# Patient Record
Sex: FEMALE | Race: WHITE | NOT HISPANIC OR LATINO | Employment: OTHER | URBAN - NONMETROPOLITAN AREA
[De-identification: names, ages, dates, MRNs, and addresses within clinical notes are randomized per-mention and may not be internally consistent; named-entity substitution may affect disease eponyms.]

---

## 2023-11-03 ENCOUNTER — OFFICE VISIT (OUTPATIENT)
Dept: PULMONOLOGY | Facility: CLINIC | Age: 58
End: 2023-11-03
Payer: COMMERCIAL

## 2023-11-03 ENCOUNTER — LAB (OUTPATIENT)
Dept: LAB | Facility: HOSPITAL | Age: 58
End: 2023-11-03
Payer: COMMERCIAL

## 2023-11-03 VITALS
HEIGHT: 65 IN | SYSTOLIC BLOOD PRESSURE: 122 MMHG | HEART RATE: 78 BPM | BODY MASS INDEX: 16.33 KG/M2 | OXYGEN SATURATION: 100 % | WEIGHT: 98 LBS | RESPIRATION RATE: 18 BRPM | DIASTOLIC BLOOD PRESSURE: 86 MMHG

## 2023-11-03 DIAGNOSIS — R06.02 SHORTNESS OF BREATH: Primary | ICD-10-CM

## 2023-11-03 DIAGNOSIS — J30.89 NON-SEASONAL ALLERGIC RHINITIS DUE TO FUNGAL SPORES: ICD-10-CM

## 2023-11-03 DIAGNOSIS — R06.02 SHORTNESS OF BREATH: ICD-10-CM

## 2023-11-03 DIAGNOSIS — Z86.16 PERSONAL HISTORY OF COVID-19: ICD-10-CM

## 2023-11-03 DIAGNOSIS — Z77.120 MOLD EXPOSURE: ICD-10-CM

## 2023-11-03 PROCEDURE — 86003 ALLG SPEC IGE CRUDE XTRC EA: CPT

## 2023-11-03 PROCEDURE — 86606 ASPERGILLUS ANTIBODY: CPT

## 2023-11-03 PROCEDURE — 36415 COLL VENOUS BLD VENIPUNCTURE: CPT

## 2023-11-03 PROCEDURE — 82785 ASSAY OF IGE: CPT

## 2023-11-03 PROCEDURE — 86612 BLASTOMYCES ANTIBODY: CPT

## 2023-11-03 RX ORDER — CETIRIZINE HYDROCHLORIDE 10 MG/1
10 TABLET ORAL DAILY
Qty: 30 TABLET | Refills: 5 | Status: SHIPPED | OUTPATIENT
Start: 2023-11-03

## 2023-11-03 RX ORDER — LANSOPRAZOLE 30 MG/1
30 CAPSULE, DELAYED RELEASE ORAL DAILY
COMMUNITY
Start: 2000-01-01

## 2023-11-03 RX ORDER — ALBUTEROL SULFATE 90 UG/1
2 AEROSOL, METERED RESPIRATORY (INHALATION) EVERY 4 HOURS PRN
Qty: 17 G | Refills: 5 | Status: SHIPPED | OUTPATIENT
Start: 2023-11-03

## 2023-11-03 NOTE — PROGRESS NOTES
New Pulmonary Patient Office Visit      Patient Name: Simi Lenz    Referring Physician: Alba Crabtree*    Chief Complaint:    Chief Complaint   Patient presents with    Breathing Problem    Consult       History of Present Illness: Simi Lenz is a 58 y.o. female who is here today to establish care with Pulmonary for intermittent episodes of shortness of breath.    COVID in July 2022 and Jan 2023, but never required hospitalization. During the episodes she had increased gerd which caused significant cough.   She has had intermittent episodes of dry coughing over the years.     Not on any allergy meds, but on prevacid for gerd for 20+ yrs.    No coughing when first lying flat.     April 2023 started getting palpitations and was diagnosed with SVT. CXR showed hyperinflation and then got pfts that were normal.     Does have more difficulty with cold air exposure causing brief episodes of shortness of breath, but remains very active and hikes frequently.  Works from home.     Moved to [place] 15 yrs ago and has had a lot of allergy and sinus issues here.  + mold in her house  Has had mold remediation and in workup multiple molds found.       Subjective      Review of Systems:   Review of Systems   HENT:  Negative for postnasal drip.    Respiratory:  Positive for cough and shortness of breath.    Gastrointestinal:  Positive for GERD.   Allergic/Immunologic: Positive for environmental allergies.       Past Medical History:   Past Medical History:   Diagnosis Date    Melanoma        Past Surgical History:   Past Surgical History:   Procedure Laterality Date    KNEE SURGERY Left     THUMB AMPUTATION      melanoma       Family History:   Family History   Problem Relation Age of Onset    Hypertension Neg Hx        Social History:   Social History     Socioeconomic History    Marital status:    Tobacco Use    Smoking status: Never    Tobacco comments:     Around second hand smoke        Medications:  "    Current Outpatient Medications:     lansoprazole (PREVACID) 30 MG capsule, Take 1 capsule by mouth Daily., Disp: , Rfl:     albuterol sulfate  (90 Base) MCG/ACT inhaler, Inhale 2 puffs Every 4 (Four) Hours As Needed for Wheezing., Disp: 17 g, Rfl: 5    cetirizine (zyrTEC) 10 MG tablet, Take 1 tablet by mouth Daily., Disp: 30 tablet, Rfl: 5    Allergies:   Allergies   Allergen Reactions    Ciprofloxacin Nausea Only    Clindamycin Itching    Codeine Swelling    Penicillins Other (See Comments)     Hospitalized as a child    Sulfa Antibiotics Other (See Comments)    Sulfamethoxazole-Trimethoprim Swelling       Objective     Physical Exam:  Vital Signs:   Vitals:    11/03/23 1023   BP: 122/86   Pulse: 78   Resp: 18   SpO2: 100%   Weight: 44.5 kg (98 lb)   Height: 165.1 cm (65\")       Physical Exam  Vitals and nursing note reviewed.   Constitutional:       Appearance: She is well-developed.   HENT:      Head: Normocephalic and atraumatic.      Right Ear: Tympanic membrane and external ear normal.      Left Ear: Tympanic membrane and external ear normal.      Nose: Nose normal. No congestion or rhinorrhea.      Mouth/Throat:      Mouth: Mucous membranes are moist.      Pharynx: Oropharynx is clear. No oropharyngeal exudate or posterior oropharyngeal erythema.   Eyes:      General: No scleral icterus.        Right eye: No discharge.         Left eye: No discharge.      Extraocular Movements: Extraocular movements intact.      Conjunctiva/sclera: Conjunctivae normal.   Neck:      Trachea: No tracheal deviation.   Cardiovascular:      Rate and Rhythm: Normal rate and regular rhythm.      Heart sounds: No murmur heard.  Pulmonary:      Effort: No respiratory distress.      Breath sounds: No wheezing, rhonchi or rales.   Abdominal:      General: There is no distension.      Palpations: Abdomen is soft.   Musculoskeletal:         General: No tenderness. Normal range of motion.      Cervical back: Normal range of " "motion and neck supple.      Right lower leg: No edema.      Left lower leg: No edema.   Skin:     General: Skin is warm and dry.      Findings: No rash.   Neurological:      Mental Status: She is alert and oriented to person, place, and time.      Motor: No weakness.      Coordination: Coordination normal.   Psychiatric:         Mood and Affect: Mood normal.         Judgment: Judgment normal.         Results Review:   Labs: Reviewed.      No results found for: \"WBC\", \"HGB\", \"HCT\", \"MCV\", \"PLT\"    No results found for: \"CBCDIF\", \"CMP\"     Micro: As of November 3, 2023   No results found for: \"RESPCX\"  No results found for: \"BLOODCX\"  No results found for: \"URINECX\"  No results found for: \"MRSACX\"  No results found for: \"MRSAPCR\"  No results found for: \"URCX\"  No components found for: \"LOWRESPCF\"  No results found for: \"THROATCX\"  No results found for: \"CULTURES\"  No components found for: \"STREPBCX\"  No results found for: \"STREPPNEUAG\"  No results found for: \"LEGIONELLA\"  No results found for: \"MYCOPLASCX\"  No results found for: \"GCCX\"  No results found for: \"WOUNDCX\"  No results found for: \"BODYFLDCX\"    ABG: No results found for: \"PHART\", \"GID6OBE\", \"PO2ART\", \"HGBBG\", \"X5GHQIUE\", \"CFIO2\", \"FCOHB\", \"CARBOXYHGB\", \"FMETHB\"    Echo:     Radiology Scans:   Last imaging was reviewed in great detail with the patient.     X-ray chest PA and lateral  Order: [id number]  Impression    Hyperexpanded lungs with no acute cardiopulmonary process.        Images reviewed, interpreted, dictated and electronically signed by Royal Mckeon MD  Narrative    TWO-VIEW CHEST      HISTORY: Heart palpitations, shortness of breath for one week,  persistent cough, right-sided chest pain. Nonsmoker.    COMPARISON: 1/16/2023    FINDINGS: Two views of the chest were performed.  The heart is normal in  size. The mediastinal and hilar contours are unremarkable. The lungs are  well expanded. There is no edema or infiltrate. There are no " effusions.  There is no pneumothorax. There is increased gas in the splenic flexure.  The visualized bony thorax is intact.  Exam End: 03/16/23 16:15    Specimen Collected: 03/16/23 19:38 Last Resulted: 03/16/23 19:40   Received From: [email or web address]  Result Received: 11/03/23 10:18       PFT IMPRESSION:   November 2023 FEV1 61%, FEV1/FVC ratio 57.  No significant bronchodilator responsiveness.  However, poor patient effort  April 2023 PFT showed FEV1 134%, FEV1/FVC ratio 77.  DLCO 147%, DLCO/%.    Assessment / Plan      Assessment/Plan:    1. Shortness of breath  Unclear etiology given PFTs previously showed FEV1 greater than 100%, but today FEV1 was 61%.  Patient is confused as well as she feels like her symptoms were actually worse in April when the original PFTs were done.  Discussed results and she would like to try as needed albuterol only.  Discussed that if she is requiring it frequently at next visit we will need to put her on a maintenance medication.  She is agreeable to this plan.    - Spirometry - Pre & Post Bronchodilator; Future  - albuterol sulfate  (90 Base) MCG/ACT inhaler; Inhale 2 puffs Every 4 (Four) Hours As Needed for Wheezing.  Dispense: 17 g; Refill: 5    2. Mold exposure  Check lab work.  - Regional Allergen Zone 8 / With IgE; Future  - Fungal Antibodies, Quantitative Double Immunodiffusion; Future    3. Personal history of COVID-19  Many symptoms seem to have started after COVID.    4. Non-seasonal allergic rhinitis due to fungal spores  Check lab work and start daily antihistamines.  - Regional Allergen Zone 8 / With IgE; Future  - cetirizine (zyrTEC) 10 MG tablet; Take 1 tablet by mouth Daily.  Dispense: 30 tablet; Refill: 5       Follow Up:   Return in about 3 months (around 2/3/2024).    Monique Finn MD  Pulmonary/Critical Care Physician   [facility]      Please note that portions of this note may have been completed with a voice recognition program. Efforts  were made to edit the dictations, but occasionally words are mistranscribed.

## 2023-11-07 ENCOUNTER — PATIENT ROUNDING (BHMG ONLY) (OUTPATIENT)
Dept: PULMONOLOGY | Facility: CLINIC | Age: 58
End: 2023-11-07
Payer: COMMERCIAL

## 2023-11-07 LAB
A FLAVUS AB SER QL ID: NEGATIVE
A FUMIGATUS AB SER QL ID: NEGATIVE
A NIGER AB SER QL ID: NEGATIVE
B DERMAT AB TITR SER: NEGATIVE {TITER}

## 2023-11-08 LAB
A ALTERNATA IGE QN: <0.1 KU/L
A FUMIGATUS IGE QN: 0.11 KU/L
AMER ROACH IGE QN: <0.1 KU/L
BAHIA GRASS IGE QN: <0.1 KU/L
BERMUDA GRASS IGE QN: <0.1 KU/L
BOXELDER IGE QN: <0.1 KU/L
C HERBARUM IGE QN: <0.1 KU/L
CAT DANDER IGE QN: <0.1 KU/L
CMN PIGWEED IGE QN: <0.1 KU/L
COMMON RAGWEED IGE QN: <0.1 KU/L
CONV CLASS DESCRIPTION: ABNORMAL
D FARINAE IGE QN: <0.1 KU/L
D PTERONYSS IGE QN: <0.1 KU/L
DOG DANDER IGE QN: <0.1 KU/L
ENGL PLANTAIN IGE QN: <0.1 KU/L
HAZELNUT POLN IGE QN: <0.1 KU/L
IGE SERPL-ACNC: 16 IU/ML (ref 6–495)
JOHNSON GRASS IGE QN: <0.1 KU/L
KENT BLUE GRASS IGE QN: <0.1 KU/L
LONDON PLANE IGE QN: <0.1 KU/L
M RACEMOSUS IGE QN: <0.1 KU/L
MT JUNIPER IGE QN: <0.1 KU/L
MUGWORT IGE QN: <0.1 KU/L
NETTLE IGE QN: <0.1 KU/L
P NOTATUM IGE QN: <0.1 KU/L
S BOTRYOSUM IGE QN: <0.1 KU/L
SHEEP SORREL IGE QN: <0.1 KU/L
SWEET GUM IGE QN: <0.1 KU/L
WHITE ELM IGE QN: <0.1 KU/L
WHITE HICKORY IGE QN: <0.1 KU/L
WHITE MULBERRY IGE QN: <0.1 KU/L
WHITE OAK IGE QN: <0.1 KU/L

## 2024-03-08 ENCOUNTER — OFFICE VISIT (OUTPATIENT)
Dept: PULMONOLOGY | Facility: CLINIC | Age: 59
End: 2024-03-08
Payer: COMMERCIAL

## 2024-03-08 VITALS
OXYGEN SATURATION: 100 % | WEIGHT: 99.6 LBS | SYSTOLIC BLOOD PRESSURE: 118 MMHG | DIASTOLIC BLOOD PRESSURE: 84 MMHG | HEART RATE: 70 BPM | HEIGHT: 65 IN | BODY MASS INDEX: 16.6 KG/M2 | RESPIRATION RATE: 18 BRPM

## 2024-03-08 DIAGNOSIS — R06.02 SHORTNESS OF BREATH: Primary | ICD-10-CM

## 2024-03-08 DIAGNOSIS — J30.89 NON-SEASONAL ALLERGIC RHINITIS DUE TO FUNGAL SPORES: ICD-10-CM

## 2024-03-08 DIAGNOSIS — R06.02 SHORTNESS OF BREATH: ICD-10-CM

## 2024-03-08 RX ORDER — ALBUTEROL SULFATE 90 UG/1
2 AEROSOL, METERED RESPIRATORY (INHALATION) EVERY 4 HOURS PRN
Qty: 17 G | Refills: 5 | Status: SHIPPED | OUTPATIENT
Start: 2024-03-08

## 2024-03-08 NOTE — PROGRESS NOTES
Pulmonary follow-up office Visit      Patient Name: Simi Lenz    Chief Complaint:    Chief Complaint   Patient presents with    Breathing Problem    Follow-up       Subjective: Simi Lenz is a 58 y.o. female who is here today for follow-up.    Past medical history:  COVID in July 2022 and Jan 2023, but never required hospitalization.   During the episodes she had increased gerd which caused significant cough.   She has had intermittent episodes of dry coughing over the years.     She has always lived in [place], but moved into a house with mold in the early 200's.   Has had mold remediation and in workup multiple molds found.     Since last visit, allergy testing showed allergies to Aspergillus, but Aspergillus and fungal antibodies were negative.  Spirometry today was unremarkable.  Has not used albuterol since last visit.   She still feels like she has shortness of breath when breathing cold air, but this is improved with using a mask.  It happens when it is below freezing and it also helps if she places her hand over her mask.    She will feel like she is suffocating with this cold weather or humid days.  It resolves when the weather changes.        Subjective      Review of Systems:   Review of Systems   HENT:  Negative for postnasal drip.    Respiratory:  Positive for shortness of breath. Negative for cough and wheezing.    Gastrointestinal:  Positive for GERD.   Allergic/Immunologic: Positive for environmental allergies.     Social History:   Social History     Socioeconomic History    Marital status:    Tobacco Use    Smoking status: Never    Smokeless tobacco: Never    Tobacco comments:     Around second hand smoke    Vaping Use    Vaping status: Never Used   Substance and Sexual Activity    Alcohol use: Never    Drug use: Never    Sexual activity: Never       Medications:     Current Outpatient Medications:     albuterol sulfate  (90 Base) MCG/ACT inhaler, Inhale 2 puffs Every 4  "(Four) Hours As Needed for Wheezing., Disp: 17 g, Rfl: 5    cetirizine (zyrTEC) 10 MG tablet, Take 1 tablet by mouth Daily., Disp: 30 tablet, Rfl: 5    lansoprazole (PREVACID) 30 MG capsule, Take 1 capsule by mouth Daily., Disp: , Rfl:     Allergies:   Allergies   Allergen Reactions    Ciprofloxacin Nausea Only    Clindamycin Itching and Other (See Comments)     Other reaction(s): Not available    Tetracycline Other (See Comments)    Codeine Swelling and Other (See Comments)    Penicillins Other (See Comments)     Hospitalized as a child    Sulfa Antibiotics Other (See Comments)    Sulfamethoxazole-Trimethoprim Swelling       Objective     Physical Exam:  Vital Signs:   Vitals:    03/08/24 1131   BP: 118/84   Pulse: 70   Resp: 18   SpO2: 100%   Weight: 45.2 kg (99 lb 9.6 oz)   Height: 165.1 cm (65\")       Physical Exam  Vitals and nursing note reviewed.   Constitutional:       General: She is not in acute distress.     Appearance: She is well-developed. She is not ill-appearing or toxic-appearing.   HENT:      Head: Normocephalic and atraumatic.      Right Ear: External ear normal.      Left Ear: External ear normal.   Eyes:      General:         Right eye: No discharge.         Left eye: No discharge.      Extraocular Movements: Extraocular movements intact.      Conjunctiva/sclera: Conjunctivae normal.   Neck:      Trachea: No tracheal deviation.   Cardiovascular:      Rate and Rhythm: Normal rate and regular rhythm.      Heart sounds: No murmur heard.  Pulmonary:      Effort: No respiratory distress.      Breath sounds: No wheezing or rhonchi.   Abdominal:      General: There is no distension.      Palpations: Abdomen is soft.   Musculoskeletal:         General: No tenderness. Normal range of motion.      Cervical back: Normal range of motion and neck supple.      Right lower leg: No edema.      Left lower leg: No edema.   Skin:     General: Skin is warm and dry.      Findings: No rash.      Comments: + right " "thumb with surgical changes from previous melanoma surgery   Neurological:      Mental Status: She is alert and oriented to person, place, and time.      Motor: No weakness.      Coordination: Coordination normal.   Psychiatric:         Mood and Affect: Mood normal.         Judgment: Judgment normal.         Results Review:   Labs: Reviewed.    Micro: As of March 8, 2024   No results found for: \"RESPCX\"  No results found for: \"BLOODCX\"  No results found for: \"URINECX\"  No results found for: \"MRSACX\"  No results found for: \"MRSAPCR\"  No results found for: \"URCX\"  No components found for: \"LOWRESPCF\"  No results found for: \"THROATCX\"  No results found for: \"CULTURES\"  No components found for: \"STREPBCX\"  No results found for: \"STREPPNEUAG\"  No results found for: \"LEGIONELLA\"  No results found for: \"MYCOPLASCX\"  No results found for: \"GCCX\"  No results found for: \"WOUNDCX\"  No results found for: \"BODYFLDCX\"    ABG: No results found for: \"PHART\", \"WLB5KTJ\", \"PO2ART\", \"HGBBG\", \"I6WDCSZN\", \"CFIO2\", \"FCOHB\", \"CARBOXYHGB\", \"FMETHB\"    Echo:     Radiology Scans:   Last imaging was reviewed in great detail with the patient.     Feb 2024 had CT abd/pelvis that showed lung bases were clear.     X-ray chest PA and lateral  Order: [id number]  Impression    Hyperexpanded lungs with no acute cardiopulmonary process.        Images reviewed, interpreted, dictated and electronically signed by Royal Mckeon MD  Narrative    TWO-VIEW CHEST      HISTORY: Heart palpitations, shortness of breath for one week,  persistent cough, right-sided chest pain. Nonsmoker.    COMPARISON: 1/16/2023    FINDINGS: Two views of the chest were performed.  The heart is normal in  size. The mediastinal and hilar contours are unremarkable. The lungs are  well expanded. There is no edema or infiltrate. There are no effusions.  There is no pneumothorax. There is increased gas in the splenic flexure.  The visualized bony thorax is intact.  Exam End: 03/16/23 " 16:15    Specimen Collected: 03/16/23 19:38 Last Resulted: 03/16/23 19:40   Received From: [facility]  Result Received: 11/03/23 10:18       PFT IMPRESSION:   March 2024 PFT showed FEV1 90%, FEV1/FVC ratio 80.  No significant bronchodilator responsiveness.    November 2023 FEV1 61%, FEV1/FVC ratio 57.  No significant bronchodilator responsiveness.  However, poor patient effort    April 2023 PFT showed FEV1 134%, FEV1/FVC ratio 77.  DLCO 147%, DLCO/%.    Assessment / Plan      Assessment/Plan:    1. Shortness of breath  No evidence of obstructive lung disease on office spirometry again today on PFTs.  At this point, I recommended that she may premedicate with albuterol before doing exercise and cold air and or humid conditions.  No need for maintenance inhalers.    - Spirometry - Pre & Post Bronchodilator; Future  - albuterol sulfate  (90 Base) MCG/ACT inhaler; Inhale 2 puffs Every 4 (Four) Hours As Needed for Wheezing.  Dispense: 17 g; Refill: 5    2. Non-seasonal allergic rhinitis due to fungal spores  Reviewed allergy testing with her.  Shows allergies to Aspergillus that are mild.  No evidence of active infection.      Follow Up:   Return in about 6 months (around 9/8/2024).    Monique Finn MD  Pulmonary/Critical Care Physician   [place]      Please note that portions of this note may have been completed with a voice recognition program. Efforts were made to edit the dictations, but occasionally words are mistranscribed.